# Patient Record
Sex: FEMALE | Race: WHITE
[De-identification: names, ages, dates, MRNs, and addresses within clinical notes are randomized per-mention and may not be internally consistent; named-entity substitution may affect disease eponyms.]

---

## 2024-12-01 ENCOUNTER — HOSPITAL ENCOUNTER (EMERGENCY)
Dept: HOSPITAL 99 - EMR | Age: 74
Discharge: HOME | End: 2024-12-01
Payer: MEDICARE

## 2024-12-01 VITALS — BODY MASS INDEX: 23.2 KG/M2

## 2024-12-01 DIAGNOSIS — J45.909: ICD-10-CM

## 2024-12-01 DIAGNOSIS — E78.00: ICD-10-CM
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
## 2024-12-01 DIAGNOSIS — S09.90XA: Primary | ICD-10-CM

## 2024-12-01 DIAGNOSIS — K21.9: ICD-10-CM

## 2024-12-01 DIAGNOSIS — M25.531: ICD-10-CM

## 2024-12-01 DIAGNOSIS — Z85.3: ICD-10-CM

## 2024-12-01 PROCEDURE — 99284 EMERGENCY DEPT VISIT MOD MDM: CPT

## 2024-12-01 RX ADMIN — ONDANSETRON 4 MG: 4 TABLET, ORALLY DISINTEGRATING ORAL at 10:50

## 2024-12-03 ENCOUNTER — HOSPITAL ENCOUNTER (EMERGENCY)
Dept: HOSPITAL 99 - EMR | Age: 74
Discharge: HOME | End: 2024-12-03
Payer: MEDICARE

## 2024-12-03 VITALS — DIASTOLIC BLOOD PRESSURE: 73 MMHG | SYSTOLIC BLOOD PRESSURE: 147 MMHG | RESPIRATION RATE: 16 BRPM

## 2024-12-03 VITALS — RESPIRATION RATE: 18 BRPM

## 2024-12-03 DIAGNOSIS — J45.909: ICD-10-CM
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
## 2024-12-03 DIAGNOSIS — K21.9: ICD-10-CM

## 2024-12-03 DIAGNOSIS — E78.00: ICD-10-CM

## 2024-12-03 DIAGNOSIS — S93.602A: Primary | ICD-10-CM

## 2024-12-03 PROCEDURE — 99283 EMERGENCY DEPT VISIT LOW MDM: CPT

## 2025-01-29 ENCOUNTER — HOSPITAL ENCOUNTER (OUTPATIENT)
Dept: HOSPITAL 99 - WDC | Age: 75
End: 2025-01-29
Payer: MEDICARE

## 2025-01-29 DIAGNOSIS — Z12.31: Primary | ICD-10-CM

## 2025-05-08 ENCOUNTER — HOSPITAL ENCOUNTER (OUTPATIENT)
Dept: HOSPITAL 99 - HWRAD | Age: 75
End: 2025-05-08
Payer: MEDICARE

## 2025-05-08 DIAGNOSIS — I25.83: ICD-10-CM

## 2025-05-08 DIAGNOSIS — I25.10: Primary | ICD-10-CM

## 2025-06-03 ENCOUNTER — EVALUATION (OUTPATIENT)
Dept: PHYSICAL THERAPY | Facility: CLINIC | Age: 75
End: 2025-06-03
Payer: MEDICARE

## 2025-06-03 DIAGNOSIS — Z91.81 AT RISK FOR FALLS: ICD-10-CM

## 2025-06-03 DIAGNOSIS — R26.89 IMPAIRMENT OF BALANCE: Primary | ICD-10-CM

## 2025-06-03 DIAGNOSIS — R53.1 WEAKNESS: ICD-10-CM

## 2025-06-03 PROCEDURE — 97110 THERAPEUTIC EXERCISES: CPT | Performed by: PHYSICAL THERAPIST

## 2025-06-03 PROCEDURE — 97162 PT EVAL MOD COMPLEX 30 MIN: CPT | Performed by: PHYSICAL THERAPIST

## 2025-06-03 NOTE — PROGRESS NOTES
PT Evaluation     Today's date: 6/3/2025  Patient name: Cheryl Sosa  : 1950  MRN: [id number]  Referring provider: Naz Greenberg MD  Dx:   Encounter Diagnosis     ICD-10-CM    1. Impairment of balance  R26.89       2. At risk for falls  Z91.81       3. Weakness  R53.1                      Assessment  Impairments: abnormal coordination, abnormal gait, abnormal muscle firing, abnormal movement, impaired balance, impaired physical strength, lacks appropriate home exercise program, safety issue, weight-bearing intolerance, poor posture , poor body mechanics, participation limitations, activity limitations and endurance  Symptom irritability: moderate    Assessment details: Pt presents w/ poor balance and proprioception, has difficulty walking, and is at risk for falling. She falls several times per week, w/ increasing frequency, which is consistent w/ her ATKINSON score. She exhibits some weakness in B gluteus ridge and medius, contributing to gait abnormalities, and difficulties in activities such as floor and chair transfers. However, gener LE strength is good. She would benefit from continued physical therapy to address these impairments and to maximize functional abilities.   Barriers to therapy: Pt-reported poor memory    Understanding of Dx/Px/POC: good     Prognosis: good    Goals  STG (4 weeks):    Improve ATKINSON score to at least 15/24.  Improve B gluteus ridge strength to at least 4/5 MMT.  Pt will fall at most 1x/week.    LTG (8-12-weeks):  Pt will be independent w/ HEP.  Pt will not have fallen for at least 1 month.  Improve ATKINSON score to at least 19/24.         Plan  Patient would benefit from: skilled physical therapy  Planned modality interventions: biofeedback, cryotherapy, TENS and thermotherapy: hydrocollator packs    Planned therapy interventions: abdominal trunk stabilization, activity modification, ADL retraining, ADL training, balance, balance/weight bearing training, behavior  modification, body mechanics training, flexibility, functional ROM exercises, gait training, home exercise program, transfer training, therapeutic exercise, therapeutic activities, stretching, strengthening, postural training, patient/caregiver education, neuromuscular re-education, nerve gliding, motor coordination training, massage, manual therapy, kinesiology taping, joint mobilization and IASTM    Frequency: 1-2x week  Duration in weeks: 12  Plan of Care beginning date: 6/3/2025  Plan of Care expiration date: 2025  Treatment plan discussed with: patient  Plan details: Pt will be I in HEP and will be able to perform ADLs w/ minimal risk for falling.           Subjective Evaluation    History of Present Illness  Mechanism of injury: She's been falling increasingly over the course of ~6 months. Her balance has been getting worse. She fell 3 times this week. Just gets bumps and bruises but hasn't had major injuries. She states she is clumsy and accident prone. She got concerned when someone mentioned they had Parkinson's so went to the doc who ordered bloodwork and sent her to PT. She does pilates 1x/week. She swims in the summer too. Feels she's becoming a couch potato bc she doesn't want to get up, and standing has become more difficult and tiresome. Is difficult to get up off the floor.   Has some back soreness/fatigue off and on for years.   Pt reports bad memory.    PMH:  Breast CA 2012 - monitored and good since then  Osteoporosis   Asthma  Quality of life: good    Patient Goals  Patient goals for therapy: improved balance, return to sport/leisure activities and independence with ADLs/IADLs  Patient goal: would like to be able to walk straight and not fall  Pain  Current pain ratin  At best pain ratin  At worst pain ratin  Location: denies pain          Objective    Gait:  B femoral IR, dynamic valgus, narrow step width w/ intermittent scissoring gait, and occasional LOB    ROM:    Hip PROM  (degrees):    Hip flexion  WFL  WFL  Hip IR   WFL  WFL  Hip ER   WFL  WFL    Strength:     Left  Right  Hip flexion  4+/5  4+/5  Knee extension 4+/5  4+/5  Knee flexion  4+/5  4+/5  Ankle DF  4+/5  4+/5    Hip abduction  4/5  4/5  Hip ER   4/5  4/5  Hip extension  4-/5  4-/5    Flexibility:    Left  Right  Hamstrings  15  25    Function:   Squat - B dynamic valgus, occasional LOB    TUG = 10 sec  5xSTS = 12 sec  DGI = 11 (Total Score = 24, Interpretation  < 19/24 = predictive of falls in the elderly,   > 22/24 = safe ambulators)    SLS: L=2 sec, R=4 sec  Tandem: 4 sec each foot fwd  NBOS: <10 sec    Pt reports memory issues    POC Expires Reeval for Medicare to be completed  Unit Limit Auth Expiration Date PT/OT/STVisit Limit   8/26/25 By visit 10 N/a N/a N/a    Completed on visit                Precautions: high falls risk, osteoporosis    Auth Status DATE 6/3 IE       NA Visit # 1        Remaining        MANUAL THERAPY                                                        THERAPEUTIC EXERCISE HEP        Patient education  Condition and POC       Glute sets x 10x5 sec       Sit<>stand x X5 (avoid dynamic valgus)       Glute max strengthening         Glute med strengthening                                             NEUROMUSCULAR REEDUCATION          NBOS         Tandem stance         SLS         Rocker pose                                             THERAPEUTIC ACTIVITY         Transfer training (floor)                                    GAIT TRAINING         Ambulation on level surfaces                                    MODALITIES

## 2025-06-03 NOTE — LETTER
Amna 3, 2025    Naz Greenberg MD  [address] [place] [id number]    Patient: Cheryl Sosa   YOB: 1950   Date of Visit: 6/3/2025     Encounter Diagnosis     ICD-10-CM    1. Impairment of balance  R26.89       2. At risk for falls  Z91.81       3. Weakness  R53.1           Dear Dr. Naz Greenberg MD:    Thank you for your recent referral of Cheryl Sosa. Please review the attached evaluation summary from Cheryl's recent visit.     Please verify that you agree with the plan of care by signing the attached order.     If you have any questions or concerns, please do not hesitate to call.     I sincerely appreciate the opportunity to share in the care of one of your patients and hope to have another opportunity to work with you in the near future.       Sincerely,    Jenni Cha, PT      Referring Provider:      I certify that I have read the below Plan of Care and certify the need for these services furnished under this plan of treatment while under my care.                    Naz Greenberg MD  [address] [place], [place] [address]  Via Fax: [phone]          PT Evaluation     Today's date: 6/3/2025  Patient name: Cheryl Sosa  : 1950  MRN: [id number]  Referring provider: Naz Greenberg MD  Dx:   Encounter Diagnosis     ICD-10-CM    1. Impairment of balance  R26.89       2. At risk for falls  Z91.81       3. Weakness  R53.1                      Assessment  Impairments: abnormal coordination, abnormal gait, abnormal muscle firing, abnormal movement, impaired balance, impaired physical strength, lacks appropriate home exercise program, safety issue, weight-bearing intolerance, poor posture , poor body mechanics, participation limitations, activity limitations and endurance  Symptom irritability: moderate    Assessment details: Pt presents w/ poor balance and proprioception, has difficulty walking, and is at risk for  falling. She falls several times per week, w/ increasing frequency, which is consistent w/ her ATKINSON score. She exhibits some weakness in B gluteus ridge and medius, contributing to gait abnormalities, and difficulties in activities such as floor and chair transfers. However, gener LE strength is good. She would benefit from continued physical therapy to address these impairments and to maximize functional abilities.   Barriers to therapy: Pt-reported poor memory    Understanding of Dx/Px/POC: good     Prognosis: good    Goals  STG (4 weeks):    Improve ATKINSON score to at least 15/24.  Improve B gluteus ridge strength to at least 4/5 MMT.  Pt will fall at most 1x/week.    LTG (8-12-weeks):  Pt will be independent w/ HEP.  Pt will not have fallen for at least 1 month.  Improve ATKINSON score to at least 19/24.         Plan  Patient would benefit from: skilled physical therapy  Planned modality interventions: biofeedback, cryotherapy, TENS and thermotherapy: hydrocollator packs    Planned therapy interventions: abdominal trunk stabilization, activity modification, ADL retraining, ADL training, balance, balance/weight bearing training, behavior modification, body mechanics training, flexibility, functional ROM exercises, gait training, home exercise program, transfer training, therapeutic exercise, therapeutic activities, stretching, strengthening, postural training, patient/caregiver education, neuromuscular re-education, nerve gliding, motor coordination training, massage, manual therapy, kinesiology taping, joint mobilization and IASTM    Frequency: 1-2x week  Duration in weeks: 12  Plan of Care beginning date: 6/3/2025  Plan of Care expiration date: 8/26/2025  Treatment plan discussed with: patient  Plan details: Pt will be I in HEP and will be able to perform ADLs w/ minimal risk for falling.           Subjective Evaluation    History of Present Illness  Mechanism of injury: She's been falling increasingly over the  course of ~6 months. Her balance has been getting worse. She fell 3 times this week. Just gets bumps and bruises but hasn't had major injuries. She states she is clumsy and accident prone. She got concerned when someone mentioned they had Parkinson's so went to the doc who ordered bloodwork and sent her to PT. She does pilates 1x/week. She swims in the summer too. Feels she's becoming a couch potato bc she doesn't want to get up, and standing has become more difficult and tiresome. Is difficult to get up off the floor.   Has some back soreness/fatigue off and on for years.   Pt reports bad memory.    PMH:  Breast CA 2012 - monitored and good since then  Osteoporosis   Asthma  Quality of life: good    Patient Goals  Patient goals for therapy: improved balance, return to sport/leisure activities and independence with ADLs/IADLs  Patient goal: would like to be able to walk straight and not fall  Pain  Current pain ratin  At best pain ratin  At worst pain ratin  Location: denies pain          Objective    Gait:  B femoral IR, dynamic valgus, narrow step width w/ intermittent scissoring gait, and occasional LOB    ROM:    Hip PROM (degrees):    Hip flexion  WFL  WFL  Hip IR   WFL  WFL  Hip ER   WFL  WFL    Strength:     Left  Right  Hip flexion  4+/5  4+/5  Knee extension 4+/5  4+/5  Knee flexion  4+/5  4+/5  Ankle DF  4+/5  4+/5    Hip abduction  4/5  4/5  Hip ER   4/5  4/5  Hip extension  4-/5  4-/5    Flexibility:    Left  Right  Hamstrings  15  25    Function:   Squat - B dynamic valgus, occasional LOB    TUG = 10 sec  5xSTS = 12 sec  DGI = 11 (Total Score = 24, Interpretation  < 19/24 = predictive of falls in the elderly,   > 22/24 = safe ambulators)    SLS: L=2 sec, R=4 sec  Tandem: 4 sec each foot fwd  NBOS: <10 sec    Pt reports memory issues    POC Expires Reeval for Medicare to be completed  Unit Limit Auth Expiration Date PT/OT/STVisit Limit   25 By visit 10 N/a N/a N/a    Completed on visit                 Precautions: high falls risk, osteoporosis    Auth Status DATE 6/3 IE       NA Visit # 1        Remaining        MANUAL THERAPY                                                        THERAPEUTIC EXERCISE HEP        Patient education  Condition and POC       Glute sets x 10x5 sec       Sit<>stand x X5 (avoid dynamic valgus)       Glute max strengthening         Glute med strengthening                                             NEUROMUSCULAR REEDUCATION          NBOS         Tandem stance         SLS         Rocker pose                                             THERAPEUTIC ACTIVITY         Transfer training (floor)                                    GAIT TRAINING         Ambulation on level surfaces                                    MODALITIES

## 2025-06-10 ENCOUNTER — TELEPHONE (OUTPATIENT)
Dept: PHYSICAL THERAPY | Facility: CLINIC | Age: 75
End: 2025-06-10

## 2025-06-10 NOTE — TELEPHONE ENCOUNTER
Attempted to reach pt on home phone regarding her missed PT appt today as she did not  her cell. No answer.

## 2025-06-10 NOTE — TELEPHONE ENCOUNTER
Called to check in as pt did not show up for her 10:45am appt with PT. Offered open appt later today and encouraged pt to call back. Reminded pt of next appt date and time.

## 2025-06-12 ENCOUNTER — OFFICE VISIT (OUTPATIENT)
Dept: PHYSICAL THERAPY | Facility: CLINIC | Age: 75
End: 2025-06-12
Payer: MEDICARE

## 2025-06-12 DIAGNOSIS — Z91.81 AT RISK FOR FALLS: Primary | ICD-10-CM

## 2025-06-12 DIAGNOSIS — R53.1 WEAKNESS: ICD-10-CM

## 2025-06-12 DIAGNOSIS — R26.89 IMPAIRMENT OF BALANCE: ICD-10-CM

## 2025-06-12 PROCEDURE — 97112 NEUROMUSCULAR REEDUCATION: CPT

## 2025-06-12 PROCEDURE — 97110 THERAPEUTIC EXERCISES: CPT

## 2025-06-12 NOTE — PROGRESS NOTES
Daily Note      Today's date: 2025  Patient name: Cheryl Sosa  : 1950  MRN: [id number]  Referring provider: Naz Greenberg MD  Dx:   Encounter Diagnosis     ICD-10-CM    1. At risk for falls  Z91.81       2. Impairment of balance  R26.89       3. Weakness  R53.1           Subjective: Pt reports that she fell yesterday when she slipped on the kitchen floor with her right arm outstretched. Pt states she hurt her right thumb, but has not seen anyone for this yet. Pt presents with an OTC wrist support on.      Objective: See treatment diary below    Assessment: Pt tolerated treatment well. Pt presents without signs and symptoms concerning for fracture, with normal coloration in the right thumb. Pt reported pain with weight-bearing through the right thumb and was advised that further diagnosis would require a physician visit. Pt declined this. Pt performed balance progressions and presented with no challenge with feet together and eyes open. Pt presented with adequate challenge with feet together and eyes closed, as well as feet together on foam. Pt required intermittent verbal cuing for dynamic knee valgus during sit-to-stand. Pt also required verbal cuing and mirror feedback to limit trunk compensation during standing hip abduction and extension.     Plan: Continue per plan of care.           POC Expires Reeval for Medicare to be completed  Unit Limit Auth Expiration Date PT/OT/STVisit Limit   25 By visit 10 N/a N/a N/a    Completed on visit                Precautions: high falls risk, osteoporosis    Auth Status DATE 6/3 IE       NA Visit # 1 2       Remaining        MANUAL THERAPY                                                        THERAPEUTIC EXERCISE HEP        Patient education  Condition and POC Reviewed POC      Glute sets x 10x5 sec 2x10x5 sec      Sit<>stand x X5 (avoid dynamic valgus) 2x10 (avoid dynamic valgus)      Bridging   2x10x3 sec      Prone hip ext   10x3s w/ bent  knee      Standing hip abd/ext   2x10 ea (VC for trunk posture)                                          NEUROMUSCULAR REEDUCATION          NBOS   FT EO  2x30s   FT EO on foam   2x30s  FT EC   2x30s       Tandem stance   1x30s ea       SLS         Rocker pose                                             THERAPEUTIC ACTIVITY         Transfer training (floor)                                    GAIT TRAINING         Ambulation on level surfaces                                    MODALITIES

## 2025-06-23 ENCOUNTER — APPOINTMENT (OUTPATIENT)
Dept: PHYSICAL THERAPY | Facility: CLINIC | Age: 75
End: 2025-06-23
Payer: MEDICARE

## 2025-07-02 ENCOUNTER — OFFICE VISIT (OUTPATIENT)
Dept: PHYSICAL THERAPY | Facility: CLINIC | Age: 75
End: 2025-07-02
Payer: MEDICARE

## 2025-07-02 DIAGNOSIS — R53.1 WEAKNESS: ICD-10-CM

## 2025-07-02 DIAGNOSIS — Z91.81 AT RISK FOR FALLS: Primary | ICD-10-CM

## 2025-07-02 DIAGNOSIS — R26.89 IMPAIRMENT OF BALANCE: ICD-10-CM

## 2025-07-02 PROCEDURE — 97110 THERAPEUTIC EXERCISES: CPT

## 2025-07-02 PROCEDURE — 97112 NEUROMUSCULAR REEDUCATION: CPT

## 2025-07-02 PROCEDURE — 97530 THERAPEUTIC ACTIVITIES: CPT

## 2025-07-02 NOTE — PROGRESS NOTES
Daily Note     Today's date: 2025  Patient name: Cheryl Sosa  : 1950  MRN: [id number]  Referring provider: Naz Greenberg MD  Dx:   Encounter Diagnosis     ICD-10-CM    1. At risk for falls  Z91.81       2. Impairment of balance  R26.89       3. Weakness  R53.1           Start Time: 1638  Stop Time: 1717  Total time in clinic (min): 39 minutes    Subjective: she reports still falling. She reports having fall from injury from first fall that happened on . She denies any injuries from other falls. She reports that her R thumb is fine now.       Objective: See treatment diary below      Assessment: Patient presented without symptom irritability or pain noted. She had good tolerance to plinth exercises with appropriate rest. She had particularly difficulty with static balance without visual input during eyes closed exercise. Education provided regarding safety with transitions. She had difficulty with sequencing during step up exercise. Tolerated treatment well. Patient would benefit from continued PT      Plan: Continue per plan of care.        POC Expires Reeval for Medicare to be completed  Unit Limit Auth Expiration Date PT/OT/STVisit Limit   25 By visit 10 N/a N/a N/a    Completed on visit                Precautions: high falls risk, osteoporosis    Auth Status DATE 6/3 IE      NA Visit # 1 2 3      Remaining        MANUAL THERAPY                                                        THERAPEUTIC EXERCISE HEP        Patient education  Condition and POC Reviewed POC      Glute sets x 10x5 sec 2x10x5 sec      Sit<>stand x X5 (avoid dynamic valgus) 2x10 (avoid dynamic valgus) 2x10 (avoid dynamic valgus)     Bridging   2x10x3 sec 2x10x3 sec     Prone hip ext   10x3s w/ bent knee 2x10. W/ knee bent     Standing hip abd/ext   2x10 ea (VC for trunk posture) 2x10 ea (VC for trunk posture)                                         NEUROMUSCULAR REEDUCATION          Gluteal  "Activation    2x10x5 sec. For neuromuscular activation.     NBOS   FT EO  2x30s   FT EO on foam   2x30s  FT EC   2x30s  2x30 sec FT EC, and 2x30 sec EC on foam.      Tandem stance   1x30s ea  2x30s ea     Forward/backward stepping    5x10 ft b/l at railing.      Sidestepping    5x10 ft b/l at railing.     SLS         Rocker pose                                             THERAPEUTIC ACTIVITY         Transfer training (floor)         Step ups    4\" step. B/l (poor sequencing)                       GAIT TRAINING         Ambulation on level surfaces                                    MODALITIES                               "

## 2025-07-08 ENCOUNTER — OFFICE VISIT (OUTPATIENT)
Dept: PHYSICAL THERAPY | Facility: CLINIC | Age: 75
End: 2025-07-08
Payer: MEDICARE

## 2025-07-08 ENCOUNTER — HOSPITAL ENCOUNTER (OUTPATIENT)
Dept: HOSPITAL 99 - RAD | Age: 75
End: 2025-07-08
Payer: MEDICARE

## 2025-07-08 DIAGNOSIS — R53.1 WEAKNESS: ICD-10-CM

## 2025-07-08 DIAGNOSIS — R26.89 IMPAIRMENT OF BALANCE: ICD-10-CM

## 2025-07-08 DIAGNOSIS — Z91.81 AT RISK FOR FALLS: Primary | ICD-10-CM

## 2025-07-08 DIAGNOSIS — R79.89: Primary | ICD-10-CM

## 2025-07-08 PROCEDURE — 97112 NEUROMUSCULAR REEDUCATION: CPT | Performed by: PHYSICAL THERAPIST

## 2025-07-08 PROCEDURE — 97110 THERAPEUTIC EXERCISES: CPT | Performed by: PHYSICAL THERAPIST

## 2025-07-08 PROCEDURE — 97530 THERAPEUTIC ACTIVITIES: CPT | Performed by: PHYSICAL THERAPIST

## 2025-07-08 NOTE — PROGRESS NOTES
Daily Note     Today's date: 2025  Patient name: Cheryl Sosa  : 1950  MRN: [id number]  Referring provider: Naz Greenberg MD  Dx:   Encounter Diagnosis     ICD-10-CM    1. At risk for falls  Z91.81       2. Impairment of balance  R26.89       3. Weakness  R53.1                      Subjective: Feels better today - had pilates yesterday and feels stronger. Thinks her shoes are too big and her feet are slipping in them. Feels she's improving overall and hasn't fallen this week. But  reports her memory is bad and can't remember if she fell last week. Her  states she fell once last week.      Objective: See treatment diary below      Assessment: Tolerated treatment well. Patient would benefit from continued PT. Pt tolerated session well overall. Poor balance and proprioception noted, but did well w/ exercises, w/ occasional CG/min A for LOB. Very challenging to perform step ups.       Plan: Continue per plan of care.        POC Expires Reeval for Medicare to be completed  Unit Limit Auth Expiration Date PT/OT/STVisit Limit   25 By visit 10 N/a N/a N/a    Completed on visit                Precautions: high falls risk, osteoporosis, poor memory    Auth Status DATE 6/3 IE     NA Visit # 1 2 3 4     Remaining        MANUAL THERAPY                                                        THERAPEUTIC EXERCISE HEP        Patient education  Condition and POC Reviewed POC      Glute sets x 10x5 sec 2x10x5 sec      Sit<>stand x X5 (avoid dynamic valgus) 2x10 (avoid dynamic valgus) 2x10 (avoid dynamic valgus) 2x10 (avoid dynamic valgus)    Bridging   2x10x3 sec 2x10x3 sec 2x10x3 sec    Prone hip ext   10x3s w/ bent knee 2x10. W/ knee bent     Standing hip abd/ext   2x10 ea (VC for trunk posture) 2x10 ea (VC for trunk posture) 2x10 ea (VC for trunk posture)                                        NEUROMUSCULAR REEDUCATION          Gluteal Activation    2x10x5 sec. For neuromuscular  "activation.     NBOS   FT EO  2x30s   FT EO on foam   2x30s  FT EC   2x30s  2x30 sec FT EC, and 2x30 sec EC on foam.  EO/EC level ground x3 bouts    Tandem stance   1x30s ea  2x30s ea 2x30s ea    Forward/backward stepping    5x10 ft b/l at railing.  4x10 ft b/l at railing.     Sidestepping    5x10 ft b/l at railing. 4x10 ft b/l at railing.     SLS     X3 each    Rocker pose     2x30 sec                                        THERAPEUTIC ACTIVITY         Transfer training (floor)         Step ups    4\" step. B/l (poor sequencing) 6\" step. B/l (poor sequencing) - w/ CS, no UE    Hurdles      Fwd/lateral 3 small hurdles x4 each             GAIT TRAINING         Ambulation on level surfaces                                    MODALITIES                                 "

## 2025-07-10 ENCOUNTER — OFFICE VISIT (OUTPATIENT)
Dept: PHYSICAL THERAPY | Facility: CLINIC | Age: 75
End: 2025-07-10
Payer: MEDICARE

## 2025-07-10 DIAGNOSIS — Z91.81 AT RISK FOR FALLS: Primary | ICD-10-CM

## 2025-07-10 DIAGNOSIS — R53.1 WEAKNESS: ICD-10-CM

## 2025-07-10 DIAGNOSIS — R26.89 IMPAIRMENT OF BALANCE: ICD-10-CM

## 2025-07-10 PROCEDURE — 97112 NEUROMUSCULAR REEDUCATION: CPT | Performed by: PHYSICAL THERAPIST

## 2025-07-10 PROCEDURE — 97530 THERAPEUTIC ACTIVITIES: CPT | Performed by: PHYSICAL THERAPIST

## 2025-07-10 PROCEDURE — 97110 THERAPEUTIC EXERCISES: CPT | Performed by: PHYSICAL THERAPIST

## 2025-07-10 NOTE — PROGRESS NOTES
Daily Note     Today's date: 7/10/2025  Patient name: Cheryl Sosa  : 1950  MRN: [id number]  Referring provider: Naz Greenberg MD  Dx:   Encounter Diagnosis     ICD-10-CM    1. At risk for falls  Z91.81       2. Impairment of balance  R26.89       3. Weakness  R53.1                      Subjective: Feels more stable the last few days. Felt like she was worked well last session.      Objective: See treatment diary below      Assessment: Tolerated treatment well. Patient would benefit from continued PT. Improving proprioception compared to last session though was very challenging w/ increased difficulty. Occasional LOB during training today, requiring PT assist      Plan: Continue per plan of care.        POC Expires Reeval for Medicare to be completed  Unit Limit Auth Expiration Date PT/OT/STVisit Limit   25 By visit 10 N/a N/a N/a    Completed on visit                Precautions: high falls risk, osteoporosis, poor memory    Auth Status DATE 6/3 IE 6/12 7/2 7/8 7/10   NA Visit # 1 2 3 4 5    Remaining        MANUAL THERAPY                                                        THERAPEUTIC EXERCISE HEP        Patient education  Condition and POC Reviewed POC      Glute sets x 10x5 sec 2x10x5 sec      Sit<>stand x X5 (avoid dynamic valgus) 2x10 (avoid dynamic valgus) 2x10 (avoid dynamic valgus) 2x10 (avoid dynamic valgus) 2x10 (avoid dynamic valgus)   Bridging   2x10x3 sec 2x10x3 sec 2x10x3 sec 2x10x3 sec   Prone hip ext   10x3s w/ bent knee 2x10. W/ knee bent     Standing hip abd/ext   2x10 ea (VC for trunk posture) 2x10 ea (VC for trunk posture) 2x10 ea (VC for trunk posture) 2x10 ea (VC for trunk posture)                                       NEUROMUSCULAR REEDUCATION          Gluteal Activation    2x10x5 sec. For neuromuscular activation.     NBOS   FT EO  2x30s   FT EO on foam   2x30s  FT EC   2x30s  2x30 sec FT EC, and 2x30 sec EC on foam.  EO/EC level ground x3 bouts EO/EC level ground  "x3 bouts  w/ perturbations   Tandem stance   1x30s ea  2x30s ea 2x30s ea 2x30s ea   Forward/backward stepping    5x10 ft b/l at railing.  4x10 ft b/l at railing.  4x10 ft b/l at railing.    Sidestepping    5x10 ft b/l at railing. 4x10 ft b/l at railing.  4x10 ft b/l at railing.    SLS     X3 each X3 each   Rocker pose     2x30 sec 2x30 sec   Step tap      8\" x10 each alt.                              THERAPEUTIC ACTIVITY         Transfer training (floor)         Step ups    4\" step. B/l (poor sequencing) 6\" step. B/l (poor sequencing) - w/ CS, no UE 6\" step. B/l (poor sequencing) - w/ CS to min A, no UE   Hurdles      Fwd/lateral 3 small hurdles x4 each Fwd/lateral 3 large hurdles x2 each (w/ min A for LOB)            GAIT TRAINING         Ambulation on level surfaces                                    MODALITIES                                   "

## 2025-07-15 ENCOUNTER — OFFICE VISIT (OUTPATIENT)
Dept: PHYSICAL THERAPY | Facility: CLINIC | Age: 75
End: 2025-07-15
Payer: MEDICARE

## 2025-07-15 DIAGNOSIS — Z91.81 AT RISK FOR FALLS: Primary | ICD-10-CM

## 2025-07-15 DIAGNOSIS — R26.89 IMPAIRMENT OF BALANCE: ICD-10-CM

## 2025-07-15 DIAGNOSIS — R53.1 WEAKNESS: ICD-10-CM

## 2025-07-15 PROCEDURE — 97110 THERAPEUTIC EXERCISES: CPT | Performed by: PHYSICAL THERAPIST

## 2025-07-15 PROCEDURE — 97112 NEUROMUSCULAR REEDUCATION: CPT | Performed by: PHYSICAL THERAPIST

## 2025-07-17 ENCOUNTER — OFFICE VISIT (OUTPATIENT)
Dept: PHYSICAL THERAPY | Facility: CLINIC | Age: 75
End: 2025-07-17
Payer: MEDICARE

## 2025-07-17 DIAGNOSIS — Z91.81 AT RISK FOR FALLS: Primary | ICD-10-CM

## 2025-07-17 DIAGNOSIS — R53.1 WEAKNESS: ICD-10-CM

## 2025-07-17 DIAGNOSIS — R26.89 IMPAIRMENT OF BALANCE: ICD-10-CM

## 2025-07-17 PROCEDURE — 97110 THERAPEUTIC EXERCISES: CPT | Performed by: PHYSICAL THERAPIST

## 2025-07-17 PROCEDURE — 97112 NEUROMUSCULAR REEDUCATION: CPT | Performed by: PHYSICAL THERAPIST

## 2025-07-17 NOTE — PROGRESS NOTES
Daily Note     Today's date: 2025  Patient name: Cheryl Sosa  : 1950  MRN: [id number]  Referring provider: Naz Greenberg MD  Dx:   Encounter Diagnosis     ICD-10-CM    1. At risk for falls  Z91.81       2. Impairment of balance  R26.89       3. Weakness  R53.1                      Subjective: Felt fine after last session.         Objective: See treatment diary below      Assessment: Tolerated treatment well. Patient would benefit from continued PT. Poor coordination t/o session today w/ multiple LOB requiring PT assist.       Plan: Continue per plan of care.        POC Expires Reeval for Medicare to be completed  Unit Limit Auth Expiration Date PT/OT/STVisit Limit   25 By visit 10 N/a N/a N/a    Completed on visit                Precautions: high falls risk, osteoporosis, poor memory    Auth Status DATE 7/2 7/8 7/10 7/15 7/17   NA Visit # 3 4 5 6 7    Remaining        MANUAL THERAPY                                                        THERAPEUTIC EXERCISE HEP        Patient education         Glute sets x        Sit<>stand x 2x10 (avoid dynamic valgus) 2x10 (avoid dynamic valgus) 2x10 (avoid dynamic valgus) 2x10 (avoid dynamic valgus) 2x10 (avoid dynamic valgus)   Bridging  2x10x3 sec 2x10x3 sec 2x10x3 sec 2x10x5 sec 2x10x5 sec   Prone hip ext  2x10. W/ knee bent   10x3s w/ bent knee [id number] w/ bent knee   Standing hip abd/ext x 2x10 ea (VC for trunk posture) 2x10 ea (VC for trunk posture) 2x10 ea (VC for trunk posture) 2x10 ea (VC for trunk posture) 2x10 ea (VC for trunk posture)                                       NEUROMUSCULAR REEDUCATION          Gluteal Activation  2x10x5 sec. For neuromuscular activation.       NBOS  2x30 sec FT EC, and 2x30 sec EC on foam.  EO/EC level ground x3 bouts EO/EC level ground x3 bouts  w/ perturbations EO/EC level ground x3 bouts    Tandem stance  2x30s ea 2x30s ea 2x30s ea 2x30s ea 2x30s ea   Forward/backward stepping  5x10 ft b/l at railing.   "4x10 ft b/l at railing.  4x10 ft b/l at railing.      Sidestepping  5x10 ft b/l at railing. 4x10 ft b/l at railing.  4x10 ft b/l at railing.      SLS   X3 each X3 each X6 each X3 each   Rocker pose   2x30 sec 2x30 sec 2x30 sec 2x30 sec   Step tap    8\" x10 each alt. 8\" x10 each alt. 8\" x10 each alt.   Cone taps     8\" cone x10 each                      THERAPEUTIC ACTIVITY         Transfer training (floor)         Step ups  4\" step. B/l (poor sequencing) 6\" step. B/l (poor sequencing) - w/ CS, no UE 6\" step. B/l (poor sequencing) - w/ CS to min A, no UE 8\" step. B/l (poor sequencing) - w/ CS no UE 8\" step. B/l (poor sequencing) - w/ CS no UE   Hurdles    Fwd/lateral 3 small hurdles x4 each Fwd/lateral 3 large hurdles x2 each (w/ min A for LOB)              GAIT TRAINING         Ambulation on level surfaces                                    MODALITIES                                       "

## 2025-07-24 ENCOUNTER — OFFICE VISIT (OUTPATIENT)
Dept: PHYSICAL THERAPY | Facility: CLINIC | Age: 75
End: 2025-07-24
Payer: MEDICARE

## 2025-07-24 DIAGNOSIS — Z91.81 AT RISK FOR FALLS: Primary | ICD-10-CM

## 2025-07-24 DIAGNOSIS — R26.89 IMPAIRMENT OF BALANCE: ICD-10-CM

## 2025-07-24 DIAGNOSIS — R53.1 WEAKNESS: ICD-10-CM

## 2025-07-24 PROCEDURE — 97112 NEUROMUSCULAR REEDUCATION: CPT | Performed by: PHYSICAL THERAPIST

## 2025-07-24 PROCEDURE — 97110 THERAPEUTIC EXERCISES: CPT | Performed by: PHYSICAL THERAPIST

## 2025-07-24 NOTE — PROGRESS NOTES
Daily Note     Today's date: 2025  Patient name: Cheryl Sosa  : 1950  MRN: [id number]  Referring provider: Naz Greenberg MD  Dx:   Encounter Diagnosis     ICD-10-CM    1. At risk for falls  Z91.81       2. Impairment of balance  R26.89       3. Weakness  R53.1                      Subjective: Is in pain in her teeth today. But generally doing better.       Objective: See treatment diary below      Assessment: Tolerated treatment well. Patient would benefit from continued PT. Continues w/ poor balance and proprioception, w/ limited self-correction from LOB, though improving overall. Fatigued w/ session.      Plan: Continue per plan of care.        POC Expires Reeval for Medicare to be completed  Unit Limit Auth Expiration Date PT/OT/STVisit Limit   25 By visit 10 N/a N/a N/a    Completed on visit                Precautions: high falls risk, osteoporosis, poor memory    Auth Status DATE 7/8 7/10 7/15 7/17 7/24   NA Visit # 4 5 6 7 8    Remaining        MANUAL THERAPY                                                        THERAPEUTIC EXERCISE HEP        Patient education         Glute sets x        Sit<>stand x 2x10 (avoid dynamic valgus) 2x10 (avoid dynamic valgus) 2x10 (avoid dynamic valgus) 2x10 (avoid dynamic valgus) X10, x13 (avoid dynamic valgus)   Bridging  2x10x3 sec 2x10x3 sec 2x10x5 sec 2x10x5 sec 2x10x5 sec   Prone hip ext    10x3s w/ bent knee [id number] w/ bent knee [id number] w/ bent knee   Standing hip abd/ext x 2x10 ea (VC for trunk posture) 2x10 ea (VC for trunk posture) 2x10 ea (VC for trunk posture) 2x10 ea (VC for trunk posture) 2x10 ea (VC for trunk posture)   Leg press      NV                              NEUROMUSCULAR REEDUCATION          Gluteal Activation         NBOS  EO/EC level ground x3 bouts EO/EC level ground x3 bouts  w/ perturbations EO/EC level ground x3 bouts  EO/EC level ground x3 bouts  w/ perturbations   Tandem stance  2x30s ea 2x30s ea 2x30s ea 2x30s ea  "2x30s ea   Forward/backward stepping  4x10 ft b/l at railing.  4x10 ft b/l at railing.       Sidestepping  4x10 ft b/l at railing.  4x10 ft b/l at railing.       SLS  X3 each X3 each X6 each X3 each X3 each   Rocker pose  2x30 sec 2x30 sec 2x30 sec 2x30 sec 2x30 sec   Step tap   8\" x10 each alt. 8\" x10 each alt. 8\" x10 each alt. 8\" x10 each alt.   Cone taps    8\" cone x10 each     Balance board      AP/ML x30 sec each             THERAPEUTIC ACTIVITY         Transfer training (floor)         Step ups  6\" step. B/l (poor sequencing) - w/ CS, no UE 6\" step. B/l (poor sequencing) - w/ CS to min A, no UE 8\" step. B/l (poor sequencing) - w/ CS no UE 8\" step. B/l (poor sequencing) - w/ CS no UE 8\" step. B/l (poor sequencing) - w/ CS-min A no UE   Hurdles   Fwd/lateral 3 small hurdles x4 each Fwd/lateral 3 large hurdles x2 each (w/ min A for LOB)               GAIT TRAINING         Ambulation on level surfaces                                    MODALITIES                                         "

## 2025-08-07 ENCOUNTER — OFFICE VISIT (OUTPATIENT)
Dept: PHYSICAL THERAPY | Facility: CLINIC | Age: 75
End: 2025-08-07
Payer: MEDICARE

## 2025-08-07 DIAGNOSIS — Z91.81 AT RISK FOR FALLS: Primary | ICD-10-CM

## 2025-08-07 DIAGNOSIS — R53.1 WEAKNESS: ICD-10-CM

## 2025-08-07 DIAGNOSIS — R26.89 IMPAIRMENT OF BALANCE: ICD-10-CM

## 2025-08-07 PROCEDURE — 97110 THERAPEUTIC EXERCISES: CPT | Performed by: PHYSICAL THERAPIST

## 2025-08-07 PROCEDURE — 97112 NEUROMUSCULAR REEDUCATION: CPT | Performed by: PHYSICAL THERAPIST

## 2025-08-14 ENCOUNTER — EVALUATION (OUTPATIENT)
Dept: PHYSICAL THERAPY | Facility: CLINIC | Age: 75
End: 2025-08-14
Payer: MEDICARE

## 2025-08-21 ENCOUNTER — OFFICE VISIT (OUTPATIENT)
Dept: PHYSICAL THERAPY | Facility: CLINIC | Age: 75
End: 2025-08-21
Payer: MEDICARE

## 2025-08-21 DIAGNOSIS — Z91.81 AT RISK FOR FALLS: Primary | ICD-10-CM

## 2025-08-21 DIAGNOSIS — R26.89 IMPAIRMENT OF BALANCE: ICD-10-CM

## 2025-08-21 DIAGNOSIS — R53.1 WEAKNESS: ICD-10-CM

## 2025-08-21 PROCEDURE — 97530 THERAPEUTIC ACTIVITIES: CPT | Performed by: PHYSICAL THERAPIST

## 2025-08-21 PROCEDURE — 97112 NEUROMUSCULAR REEDUCATION: CPT | Performed by: PHYSICAL THERAPIST

## 2025-08-21 PROCEDURE — 97110 THERAPEUTIC EXERCISES: CPT | Performed by: PHYSICAL THERAPIST
